# Patient Record
Sex: FEMALE | Race: WHITE | Employment: OTHER | URBAN - NONMETROPOLITAN AREA
[De-identification: names, ages, dates, MRNs, and addresses within clinical notes are randomized per-mention and may not be internally consistent; named-entity substitution may affect disease eponyms.]

---

## 2017-01-03 ENCOUNTER — OFFICE VISIT (OUTPATIENT)
Dept: OTOLARYNGOLOGY | Age: 77
End: 2017-01-03

## 2017-01-03 VITALS
TEMPERATURE: 97.7 F | BODY MASS INDEX: 26.82 KG/M2 | DIASTOLIC BLOOD PRESSURE: 86 MMHG | HEART RATE: 72 BPM | HEIGHT: 64 IN | SYSTOLIC BLOOD PRESSURE: 144 MMHG | RESPIRATION RATE: 16 BRPM | WEIGHT: 157.1 LBS

## 2017-01-03 DIAGNOSIS — R93.0: Primary | ICD-10-CM

## 2017-01-03 PROCEDURE — 99202 OFFICE O/P NEW SF 15 MIN: CPT | Performed by: OTOLARYNGOLOGY

## 2017-01-03 ASSESSMENT — ENCOUNTER SYMPTOMS
WHEEZING: 0
SHORTNESS OF BREATH: 0
DIARRHEA: 0
COUGH: 0
APNEA: 0
CHEST TIGHTNESS: 0
SORE THROAT: 0
CHOKING: 0
SINUS PRESSURE: 1
TROUBLE SWALLOWING: 1
COLOR CHANGE: 0
VOMITING: 0
VOICE CHANGE: 0
STRIDOR: 0
ABDOMINAL PAIN: 0
RHINORRHEA: 1
FACIAL SWELLING: 0
NAUSEA: 0

## 2017-06-20 ENCOUNTER — LAB SERVICES (OUTPATIENT)
Dept: OTHER | Age: 77
End: 2017-06-20

## 2017-06-20 ENCOUNTER — CHARTING TRANS (OUTPATIENT)
Dept: URGENT CARE | Age: 77
End: 2017-06-20

## 2017-06-20 LAB
BILIRUBIN URINE: NEGATIVE
BLOOD URINE: ABNORMAL
CLARITY: ABNORMAL
COLOR: ABNORMAL
GLUCOSE QUALITATIVE U: NEGATIVE
KETONES, URINE: NEGATIVE
LEUKOCYTE ESTERASE URINE: ABNORMAL
MUCOUS: ABNORMAL
NITRITE URINE: NEGATIVE
PH URINE: 6 (ref 5–7)
RED BLOOD CELLS URINE: ABNORMAL (ref 0–3)
SPECIFIC GRAVITY URINE: 1 (ref 1–1.03)
SQUAMOUS EPITHELIAL CELLS: ABNORMAL
TRANSITIONAL EPITHELIAL CELLS: ABNORMAL
URINE PROTEIN, QUAL (DIPSTICK): NEGATIVE
UROBILINOGEN URINE: <2
WBC CLUMPS: ABNORMAL
WHITE BLOOD CELLS URINE: ABNORMAL (ref 0–5)

## 2017-06-21 LAB — FINAL REPORT: ABNORMAL

## 2018-02-15 ENCOUNTER — HOSPITAL ENCOUNTER (OUTPATIENT)
Dept: MRI IMAGING | Age: 78
Discharge: HOME | End: 2018-02-15
Payer: MEDICARE

## 2018-02-15 DIAGNOSIS — H53.2 DIPLOPIA: ICD-10-CM

## 2018-02-15 LAB — POC CREATININE WHOLE BLOOD: 0.9 MG/DL (ref 0.5–1.2)

## 2018-02-15 PROCEDURE — 70543 MRI ORBT/FAC/NCK W/O &W/DYE: CPT

## 2018-02-15 PROCEDURE — 82565 ASSAY OF CREATININE: CPT

## 2018-02-15 PROCEDURE — 70553 MRI BRAIN STEM W/O & W/DYE: CPT

## 2018-02-15 PROCEDURE — A9579 GAD-BASE MR CONTRAST NOS,1ML: HCPCS | Performed by: OPHTHALMOLOGY

## 2018-02-15 PROCEDURE — 6360000004 HC RX CONTRAST MEDICATION: Performed by: OPHTHALMOLOGY

## 2018-02-15 RX ADMIN — GADOTERIDOL 15 ML: 279.3 INJECTION, SOLUTION INTRAVENOUS at 14:03

## 2018-03-13 ENCOUNTER — HOSPITAL ENCOUNTER (OUTPATIENT)
Dept: CT IMAGING | Age: 78
Discharge: HOME | End: 2018-03-13
Payer: MEDICARE

## 2018-03-13 DIAGNOSIS — H53.2 DIPLOPIA: ICD-10-CM

## 2018-03-13 PROCEDURE — 70498 CT ANGIOGRAPHY NECK: CPT

## 2018-03-13 PROCEDURE — 6360000004 HC RX CONTRAST MEDICATION: Performed by: INTERNAL MEDICINE

## 2018-03-13 RX ADMIN — IOPAMIDOL 85 ML: 755 INJECTION, SOLUTION INTRAVENOUS at 12:52

## 2018-11-21 ENCOUNTER — HOSPITAL ENCOUNTER (OUTPATIENT)
Dept: WOMENS IMAGING | Age: 78
Discharge: HOME | End: 2018-11-21
Payer: MEDICARE

## 2018-11-21 DIAGNOSIS — Z12.31 VISIT FOR SCREENING MAMMOGRAM: ICD-10-CM

## 2018-11-21 PROCEDURE — 77067 SCR MAMMO BI INCL CAD: CPT

## 2018-11-28 VITALS
DIASTOLIC BLOOD PRESSURE: 70 MMHG | TEMPERATURE: 97.7 F | RESPIRATION RATE: 14 BRPM | HEART RATE: 72 BPM | SYSTOLIC BLOOD PRESSURE: 154 MMHG

## 2018-11-30 ENCOUNTER — HOSPITAL ENCOUNTER (OUTPATIENT)
Dept: WOMENS IMAGING | Age: 78
Discharge: HOME | End: 2018-11-30
Payer: MEDICARE

## 2018-11-30 DIAGNOSIS — R92.2 BREAST DENSITY: ICD-10-CM

## 2018-11-30 PROCEDURE — 76642 ULTRASOUND BREAST LIMITED: CPT

## 2018-11-30 PROCEDURE — G0279 TOMOSYNTHESIS, MAMMO: HCPCS

## 2019-05-29 ENCOUNTER — HOSPITAL ENCOUNTER (OUTPATIENT)
Dept: WOMENS IMAGING | Age: 79
Discharge: HOME | End: 2019-05-29
Payer: MEDICARE

## 2019-05-29 DIAGNOSIS — R92.2 BREAST DENSITY: ICD-10-CM

## 2019-05-29 PROCEDURE — G0279 TOMOSYNTHESIS, MAMMO: HCPCS

## 2019-08-02 RX ORDER — FUROSEMIDE 20 MG/1
20 TABLET ORAL 2 TIMES DAILY
COMMUNITY
End: 2022-10-20 | Stop reason: SDUPTHER

## 2019-08-08 ENCOUNTER — HOSPITAL ENCOUNTER (OUTPATIENT)
Age: 79
Discharge: HOME | End: 2019-08-08
Payer: MEDICARE

## 2019-08-08 LAB
ANION GAP SERPL CALCULATED.3IONS-SCNC: 12 MEQ/L (ref 8–16)
BUN BLDV-MCNC: 18 MG/DL (ref 7–22)
CALCIUM SERPL-MCNC: 9.6 MG/DL (ref 8.5–10.5)
CHLORIDE BLD-SCNC: 101 MEQ/L (ref 98–111)
CO2: 28 MEQ/L (ref 23–33)
CREAT SERPL-MCNC: 1 MG/DL (ref 0.4–1.2)
EKG ATRIAL RATE: 68 BPM
EKG P AXIS: 54 DEGREES
EKG P-R INTERVAL: 164 MS
EKG Q-T INTERVAL: 450 MS
EKG QRS DURATION: 132 MS
EKG QTC CALCULATION (BAZETT): 478 MS
EKG R AXIS: -12 DEGREES
EKG T AXIS: -15 DEGREES
EKG VENTRICULAR RATE: 68 BPM
ERYTHROCYTE [DISTWIDTH] IN BLOOD BY AUTOMATED COUNT: 16.2 % (ref 11.5–14.5)
ERYTHROCYTE [DISTWIDTH] IN BLOOD BY AUTOMATED COUNT: 51.8 FL (ref 35–45)
GFR SERPL CREATININE-BSD FRML MDRD: 54 ML/MIN/1.73M2
GLUCOSE BLD-MCNC: 103 MG/DL (ref 70–108)
HCT VFR BLD CALC: 39.9 % (ref 37–47)
HEMOGLOBIN: 12.4 GM/DL (ref 12–16)
MCH RBC QN AUTO: 27.5 PG (ref 26–33)
MCHC RBC AUTO-ENTMCNC: 31.1 GM/DL (ref 32.2–35.5)
MCV RBC AUTO: 88.5 FL (ref 81–99)
PLATELET # BLD: 328 THOU/MM3 (ref 130–400)
PMV BLD AUTO: 9.5 FL (ref 9.4–12.4)
POTASSIUM SERPL-SCNC: 4.1 MEQ/L (ref 3.5–5.2)
RBC # BLD: 4.51 MILL/MM3 (ref 4.2–5.4)
SODIUM BLD-SCNC: 141 MEQ/L (ref 135–145)
WBC # BLD: 6.2 THOU/MM3 (ref 4.8–10.8)

## 2019-08-08 PROCEDURE — 93005 ELECTROCARDIOGRAM TRACING: CPT | Performed by: SPECIALIST

## 2019-08-08 PROCEDURE — 93010 ELECTROCARDIOGRAM REPORT: CPT | Performed by: INTERNAL MEDICINE

## 2019-08-08 PROCEDURE — 80048 BASIC METABOLIC PNL TOTAL CA: CPT

## 2019-08-08 PROCEDURE — 36415 COLL VENOUS BLD VENIPUNCTURE: CPT

## 2019-08-08 PROCEDURE — 85027 COMPLETE CBC AUTOMATED: CPT

## 2019-08-09 ENCOUNTER — HOSPITAL ENCOUNTER (OUTPATIENT)
Age: 79
Setting detail: OUTPATIENT SURGERY
Discharge: HOME | End: 2019-08-09
Attending: SPECIALIST | Admitting: SPECIALIST
Payer: MEDICARE

## 2019-08-09 ENCOUNTER — ANESTHESIA (OUTPATIENT)
Dept: OPERATING ROOM | Age: 79
End: 2019-08-09
Payer: MEDICARE

## 2019-08-09 ENCOUNTER — ANESTHESIA EVENT (OUTPATIENT)
Dept: OPERATING ROOM | Age: 79
End: 2019-08-09
Payer: MEDICARE

## 2019-08-09 VITALS
RESPIRATION RATE: 3 BRPM | SYSTOLIC BLOOD PRESSURE: 117 MMHG | OXYGEN SATURATION: 93 % | DIASTOLIC BLOOD PRESSURE: 55 MMHG

## 2019-08-09 VITALS
BODY MASS INDEX: 28.52 KG/M2 | HEART RATE: 78 BPM | OXYGEN SATURATION: 94 % | HEIGHT: 62 IN | WEIGHT: 155 LBS | RESPIRATION RATE: 10 BRPM | DIASTOLIC BLOOD PRESSURE: 73 MMHG | TEMPERATURE: 96.8 F | SYSTOLIC BLOOD PRESSURE: 159 MMHG

## 2019-08-09 PROCEDURE — 7100000010 HC PHASE II RECOVERY - FIRST 15 MIN: Performed by: SPECIALIST

## 2019-08-09 PROCEDURE — 6370000000 HC RX 637 (ALT 250 FOR IP)

## 2019-08-09 PROCEDURE — 2580000003 HC RX 258: Performed by: ANESTHESIOLOGY

## 2019-08-09 PROCEDURE — 2500000003 HC RX 250 WO HCPCS: Performed by: SPECIALIST

## 2019-08-09 PROCEDURE — 7100000001 HC PACU RECOVERY - ADDTL 15 MIN: Performed by: SPECIALIST

## 2019-08-09 PROCEDURE — 3600000002 HC SURGERY LEVEL 2 BASE: Performed by: SPECIALIST

## 2019-08-09 PROCEDURE — 6360000002 HC RX W HCPCS: Performed by: ANESTHESIOLOGY

## 2019-08-09 PROCEDURE — 88305 TISSUE EXAM BY PATHOLOGIST: CPT

## 2019-08-09 PROCEDURE — 88331 PATH CONSLTJ SURG 1 BLK 1SPC: CPT

## 2019-08-09 PROCEDURE — 3600000012 HC SURGERY LEVEL 2 ADDTL 15MIN: Performed by: SPECIALIST

## 2019-08-09 PROCEDURE — 2709999900 HC NON-CHARGEABLE SUPPLY: Performed by: SPECIALIST

## 2019-08-09 PROCEDURE — 7100000011 HC PHASE II RECOVERY - ADDTL 15 MIN: Performed by: SPECIALIST

## 2019-08-09 PROCEDURE — 6370000000 HC RX 637 (ALT 250 FOR IP): Performed by: ANESTHESIOLOGY

## 2019-08-09 PROCEDURE — 6360000002 HC RX W HCPCS

## 2019-08-09 PROCEDURE — 3700000000 HC ANESTHESIA ATTENDED CARE: Performed by: SPECIALIST

## 2019-08-09 PROCEDURE — 3700000001 HC ADD 15 MINUTES (ANESTHESIA): Performed by: SPECIALIST

## 2019-08-09 PROCEDURE — 7100000000 HC PACU RECOVERY - FIRST 15 MIN: Performed by: SPECIALIST

## 2019-08-09 RX ORDER — ONDANSETRON 2 MG/ML
4 INJECTION INTRAMUSCULAR; INTRAVENOUS ONCE
Status: COMPLETED | OUTPATIENT
Start: 2019-08-09 | End: 2019-08-09

## 2019-08-09 RX ORDER — ONDANSETRON 2 MG/ML
4 INJECTION INTRAMUSCULAR; INTRAVENOUS
Status: COMPLETED | OUTPATIENT
Start: 2019-08-09 | End: 2019-08-09

## 2019-08-09 RX ORDER — DEXAMETHASONE SODIUM PHOSPHATE 4 MG/ML
4 INJECTION, SOLUTION INTRA-ARTICULAR; INTRALESIONAL; INTRAMUSCULAR; INTRAVENOUS; SOFT TISSUE ONCE
Status: COMPLETED | OUTPATIENT
Start: 2019-08-09 | End: 2019-08-09

## 2019-08-09 RX ORDER — SODIUM CHLORIDE 9 MG/ML
INJECTION, SOLUTION INTRAVENOUS CONTINUOUS PRN
Status: DISCONTINUED | OUTPATIENT
Start: 2019-08-09 | End: 2019-08-09 | Stop reason: SDUPTHER

## 2019-08-09 RX ORDER — PROPOFOL 10 MG/ML
INJECTION, EMULSION INTRAVENOUS PRN
Status: DISCONTINUED | OUTPATIENT
Start: 2019-08-09 | End: 2019-08-09 | Stop reason: SDUPTHER

## 2019-08-09 RX ORDER — FENTANYL CITRATE 50 UG/ML
50 INJECTION, SOLUTION INTRAMUSCULAR; INTRAVENOUS EVERY 5 MIN PRN
Status: DISCONTINUED | OUTPATIENT
Start: 2019-08-09 | End: 2019-08-09 | Stop reason: HOSPADM

## 2019-08-09 RX ORDER — FENTANYL CITRATE 50 UG/ML
INJECTION, SOLUTION INTRAMUSCULAR; INTRAVENOUS PRN
Status: DISCONTINUED | OUTPATIENT
Start: 2019-08-09 | End: 2019-08-09 | Stop reason: SDUPTHER

## 2019-08-09 RX ORDER — ACETAMINOPHEN 325 MG/1
650 TABLET ORAL ONCE
Status: COMPLETED | OUTPATIENT
Start: 2019-08-09 | End: 2019-08-09

## 2019-08-09 RX ORDER — ACETAMINOPHEN 325 MG/1
TABLET ORAL
Status: COMPLETED
Start: 2019-08-09 | End: 2019-08-09

## 2019-08-09 RX ORDER — MINERAL OIL AND WHITE PETROLATUM 150; 830 MG/G; MG/G
OINTMENT OPHTHALMIC PRN
Status: DISCONTINUED | OUTPATIENT
Start: 2019-08-09 | End: 2019-08-09 | Stop reason: SDUPTHER

## 2019-08-09 RX ORDER — MEPERIDINE HYDROCHLORIDE 25 MG/ML
12.5 INJECTION INTRAMUSCULAR; INTRAVENOUS; SUBCUTANEOUS EVERY 5 MIN PRN
Status: DISCONTINUED | OUTPATIENT
Start: 2019-08-09 | End: 2019-08-09 | Stop reason: HOSPADM

## 2019-08-09 RX ORDER — LABETALOL 20 MG/4 ML (5 MG/ML) INTRAVENOUS SYRINGE
5 EVERY 10 MIN PRN
Status: DISCONTINUED | OUTPATIENT
Start: 2019-08-09 | End: 2019-08-09 | Stop reason: HOSPADM

## 2019-08-09 RX ORDER — LIDOCAINE HYDROCHLORIDE AND EPINEPHRINE 10; 10 MG/ML; UG/ML
INJECTION, SOLUTION INFILTRATION; PERINEURAL PRN
Status: DISCONTINUED | OUTPATIENT
Start: 2019-08-09 | End: 2019-08-09 | Stop reason: ALTCHOICE

## 2019-08-09 RX ORDER — DEXAMETHASONE SODIUM PHOSPHATE 4 MG/ML
INJECTION, SOLUTION INTRA-ARTICULAR; INTRALESIONAL; INTRAMUSCULAR; INTRAVENOUS; SOFT TISSUE
Status: COMPLETED
Start: 2019-08-09 | End: 2019-08-09

## 2019-08-09 RX ADMIN — Medication 2 G: at 14:52

## 2019-08-09 RX ADMIN — FENTANYL CITRATE 25 MCG: 50 INJECTION INTRAMUSCULAR; INTRAVENOUS at 16:09

## 2019-08-09 RX ADMIN — FENTANYL CITRATE 25 MCG: 50 INJECTION, SOLUTION INTRAMUSCULAR; INTRAVENOUS at 18:30

## 2019-08-09 RX ADMIN — DEXAMETHASONE SODIUM PHOSPHATE 4 MG: 4 INJECTION, SOLUTION INTRAMUSCULAR; INTRAVENOUS at 18:06

## 2019-08-09 RX ADMIN — SODIUM CHLORIDE: 9 INJECTION, SOLUTION INTRAVENOUS at 14:40

## 2019-08-09 RX ADMIN — ONDANSETRON 4 MG: 2 INJECTION INTRAMUSCULAR; INTRAVENOUS at 18:26

## 2019-08-09 RX ADMIN — ONDANSETRON 4 MG: 2 INJECTION INTRAMUSCULAR; INTRAVENOUS at 17:51

## 2019-08-09 RX ADMIN — DEXAMETHASONE SODIUM PHOSPHATE 4 MG: 4 INJECTION, SOLUTION INTRA-ARTICULAR; INTRALESIONAL; INTRAMUSCULAR; INTRAVENOUS; SOFT TISSUE at 18:06

## 2019-08-09 RX ADMIN — FENTANYL CITRATE 50 MCG: 50 INJECTION INTRAMUSCULAR; INTRAVENOUS at 14:45

## 2019-08-09 RX ADMIN — ACETAMINOPHEN 650 MG: 325 TABLET ORAL at 19:55

## 2019-08-09 RX ADMIN — FENTANYL CITRATE 25 MCG: 50 INJECTION INTRAMUSCULAR; INTRAVENOUS at 15:39

## 2019-08-09 RX ADMIN — PROPOFOL 150 MG: 10 INJECTION, EMULSION INTRAVENOUS at 14:45

## 2019-08-09 RX ADMIN — FENTANYL CITRATE 25 MCG: 50 INJECTION, SOLUTION INTRAMUSCULAR; INTRAVENOUS at 18:14

## 2019-08-09 RX ADMIN — MINERAL OIL AND WHITE PETROLATUM 1 INCH: 150; 830 OINTMENT OPHTHALMIC at 14:45

## 2019-08-09 ASSESSMENT — PULMONARY FUNCTION TESTS
PIF_VALUE: 2
PIF_VALUE: 0
PIF_VALUE: 2
PIF_VALUE: 3
PIF_VALUE: 2
PIF_VALUE: 3
PIF_VALUE: 2
PIF_VALUE: 0
PIF_VALUE: 2
PIF_VALUE: 3
PIF_VALUE: 2
PIF_VALUE: 4
PIF_VALUE: 16
PIF_VALUE: 2
PIF_VALUE: 3
PIF_VALUE: 1
PIF_VALUE: 2
PIF_VALUE: 2
PIF_VALUE: 0
PIF_VALUE: 2
PIF_VALUE: 1
PIF_VALUE: 2
PIF_VALUE: 14
PIF_VALUE: 2
PIF_VALUE: 2
PIF_VALUE: 0
PIF_VALUE: 2
PIF_VALUE: 2
PIF_VALUE: 3
PIF_VALUE: 3
PIF_VALUE: 2
PIF_VALUE: 14
PIF_VALUE: 2
PIF_VALUE: 2
PIF_VALUE: 3
PIF_VALUE: 2
PIF_VALUE: 2
PIF_VALUE: 1
PIF_VALUE: 2
PIF_VALUE: 3
PIF_VALUE: 2
PIF_VALUE: 3
PIF_VALUE: 2
PIF_VALUE: 0
PIF_VALUE: 2
PIF_VALUE: 3
PIF_VALUE: 2
PIF_VALUE: 1
PIF_VALUE: 2
PIF_VALUE: 3
PIF_VALUE: 2
PIF_VALUE: 0
PIF_VALUE: 2
PIF_VALUE: 4
PIF_VALUE: 2
PIF_VALUE: 3
PIF_VALUE: 2
PIF_VALUE: 2

## 2019-08-09 ASSESSMENT — PAIN SCALES - GENERAL
PAINLEVEL_OUTOF10: 8
PAINLEVEL_OUTOF10: 7
PAINLEVEL_OUTOF10: 8

## 2019-08-09 ASSESSMENT — PAIN SCALES - WONG BAKER: WONGBAKER_NUMERICALRESPONSE: 0

## 2019-08-09 ASSESSMENT — PAIN - FUNCTIONAL ASSESSMENT: PAIN_FUNCTIONAL_ASSESSMENT: 0-10

## 2019-08-09 NOTE — OP NOTE
Operative Note    Patient name: Hunter Casas             Medical Record Number: [id number]    Primary Care Physician: Mihaela Haney MD     1940    Date of Procedure: 2019    Pre-operative Diagnosis:  1. Complex (through and through) left ala 4cm2 defect s/p MOHS for basal xu carcinoma      2. 1.2cm left lateral forehead basal cell carcinoma    Post-operative Diagnosis: Same    Procedure Performed:  1. Formation and transfer of pedicle flap to nose (CPT 76168)      2. Composite cartilage graft from left ear to nose for support and lining (CPT 13065)      3. Full thickness skin graft (3 cm2) to left ear to repair defect from cartilage graft (CPT 13756)      4. Excision of left lateral forehead basal cell carcinoma creating a 2cm2 defect that was repaired with adjacent tissue transfer          (6cm2)(CPT 47793)    Surgeons/Assistants: Dr. Steffen Ovalle PA-C    Estimated Blood Loss: 37NC     Complications: none immediately appreciated    Procedure: With the patient lying in the supine position and under adequate anesthesia per the anesthesia team.   Local anesthesia consisting of 23 ml of 1% Lidocaine 1:100,000 with epinephrine solution of the donor site of the left infraclavicular area  as well as the left nasal ala/cheek, left ear and left lateral forehead basal cell carcinoma. The carcinoma was excised and sent for fresh frozen evaluation after the area was prepped and draped in the standard surgical fashion. Pathology called back to the room and stated the margin were free. The patient has very thin skin and a large defect which could not be closed primarily without distortion of the left brow, therefore a rotation flap was designed elevated and inset with 4-0 Monocryl suture. The burrow's triangles were removed and then final closure was completed with 5-0 fast absorbing suture and bacitracin.   A deep/inferior margin was excised of the left ala as well and sent for

## 2019-08-09 NOTE — ANESTHESIA PRE PROCEDURE
tablet Take 81 mg by mouth daily. Yes Historical Provider, MD   polyethylene glycol (GLYCOLAX) powder Colonoscopy Prep Dispense 255 Gram Bottle. Use as Directed 4/26/18   Chico Knox MD   nitroGLYCERIN (NITROSTAT) 0.4 MG SL tablet Place 1 tablet under the tongue every 5 minutes as needed for Chest pain 5/18/16   Luisa Bradshaw MD       Current medications:    No current facility-administered medications for this encounter. Allergies: Allergies   Allergen Reactions    Pcn [Penicillins]      Swelling at site of injection    Sulfa Antibiotics Hives       Problem List:    Patient Active Problem List   Diagnosis Code    Incontinence of bowel R15.9    Chest pain syndrome R07.9    GERD (gastroesophageal reflux disease) K21.9       Past Medical History:        Diagnosis Date    Anemia     Arthritis     Bronchitis     CAD (coronary artery disease)     Depression     Fibromyalgia     Frozen shoulder     left    GERD (gastroesophageal reflux disease)     History of shingles     Hyperlipidemia     Hypertension     Mitral valve prolapse     Osteoarthritis     Pernicious anemia     Pleurisy     Sinus infection     Skin cancer 2015, 2016    removed from her nose       Past Surgical History:        Procedure Laterality Date    CARDIAC CATHETERIZATION      COLONOSCOPY  2003, 2012    [facility]    ELBOW SURGERY Right     FOOT SURGERY Right 12/07/2016    REMOVAL OF SCREW AND PLATE    HEEL SPUR SURGERY Right 6/1/2016    HEMORRHOID SURGERY      HYSTERECTOMY      MOHS SURGERY Right 9/25/2015    [facility]    SHOULDER SURGERY Right     TONSILLECTOMY      UPPER GASTROINTESTINAL ENDOSCOPY  2003, 2008, 2012 2008 and 2012 with dilation, [facility]       Social History:    Social History     Tobacco Use    Smoking status: Never Smoker    Smokeless tobacco: Never Used   Substance Use Topics    Alcohol use:  No                                Counseling given: Not Answered      Vital Signs (Current): Vitals:    08/05/19 1325 08/09/19 1241   BP:  (!) 171/79   Pulse:  58   Resp:  16   Temp:  98.2 °F (36.8 °C)   TempSrc:  Temporal   SpO2:  94%   Weight: 150 lb (68 kg) 155 lb (70.3 kg)   Height: 5' 2\" (1.575 m)                                               BP Readings from Last 3 Encounters:   08/09/19 (!) 171/79   04/26/18 126/82   01/03/17 (!) 144/86       NPO Status: Time of last liquid consumption: 2200                        Time of last solid consumption: 2000                        Date of last liquid consumption: 08/08/19                        Date of last solid food consumption: 08/08/19    BMI:   Wt Readings from Last 3 Encounters:   08/09/19 155 lb (70.3 kg)   04/26/18 156 lb 4 oz (70.9 kg)   01/03/17 157 lb 1.6 oz (71.3 kg)     Body mass index is 28.35 kg/m². CBC:   Lab Results   Component Value Date    WBC 6.2 08/08/2019    RBC 4.51 08/08/2019    HGB 12.4 08/08/2019    HCT 39.9 08/08/2019    MCV 88.5 08/08/2019    RDW 15.6 05/18/2016     08/08/2019       CMP:   Lab Results   Component Value Date     08/08/2019    K 4.1 08/08/2019     08/08/2019    CO2 28 08/08/2019    BUN 18 08/08/2019    CREATININE 1.0 08/08/2019    LABGLOM 54 08/08/2019    GLUCOSE 103 08/08/2019    PROT 6.6 05/18/2016    CALCIUM 9.6 08/08/2019    BILITOT 0.3 05/18/2016    ALKPHOS 87 05/18/2016    AST 19 05/18/2016    ALT 10 05/18/2016       POC Tests: No results for input(s): POCGLU, POCNA, POCK, POCCL, POCBUN, POCHEMO, POCHCT in the last 72 hours.     Coags:   Lab Results   Component Value Date    APTT 31.9 05/18/2016       HCG (If Applicable): No results found for: PREGTESTUR, PREGSERUM, HCG, HCGQUANT     ABGs: No results found for: PHART, PO2ART, LOJ6GSF, OEA5OXK, BEART, G8GWBJSR     Type & Screen (If Applicable):  No results found for: [facility]    Anesthesia Evaluation  Patient summary reviewed and Nursing notes reviewed no history of anesthetic complications:   Airway: Mallampati: III  TM distance: >3 FB   Neck ROM: full  Mouth opening: > = 3 FB Dental:          Pulmonary:Negative Pulmonary ROS and normal exam  breath sounds clear to auscultation                             Cardiovascular:  Exercise tolerance: good (>4 METS),   (+) hypertension:, valvular problems/murmurs: MVP, CAD:,       ECG reviewed                        Neuro/Psych:   (+) neuromuscular disease:, psychiatric history:            GI/Hepatic/Renal:   (+) GERD: well controlled,           Endo/Other:    (+) blood dyscrasia::., .                  ROS comment: Pernicious anemia Abdominal:           Vascular: negative vascular ROS. Anesthesia Plan      general     ASA 3       Induction: intravenous. MIPS: Postoperative opioids intended and Prophylactic antiemetics administered. Anesthetic plan and risks discussed with patient.                       Aleida Yang,    8/9/2019

## 2019-08-09 NOTE — PROGRESS NOTES
In preparation for their surgical procedure above patient was screened for Obstructive Sleep Apnea (JAMES) using the STOP-Bang Questionnaire by the Pre-Admission Testing department. This is a pre-surgical screening tool for patient safety and serves as a recommendation, this WILL NOT cause cancellation of surgery. STOP-Bang Questionnaire  * Do you currently see a pulmonologist?  No     If yes STOP, do not complete. }. 1.  Do you snore loudly (able to be heard in the next room)? No    2. Do you often feel tired or sleepy during the daytime? No       3. Has anyone ever told you that you stop breathing during your sleep? No    4. Do you have or are you being treated for high blood pressure? Yes      5. BMI more than 35? BMI (Calculated): 27.5        No    6. Age over 48 years? 67 y.o.      yes    7. Neck Circumference greater than 17 inches for male or 16 inches for female? Measured           (visits only)            No    8. Gender Male? No      TOTAL SCORE: 2    JAMES - Low Risk : Yes to 0 - 2 questions  JAMES - Intermediate Risk : Yes to 3 - 4 questions  JAMES - High Risk : Yes to 5 - 8 questions    Adapted from:   STOP Questionnaire: A Tool to Screen Patients for Obstructive Sleep Apnea   Milderd MARISSA Aponte.C.P.C., ANGELA Gutierrez.B.B.S., Kristi James M.D., Eboni Camejo. Gricelda Dias, Ph.D., ANGELA Zuniga.B.B.S., Moises Saint, M.Sc., Tai Marc M.D., Chuy Valderrama. LIBRA Wilson.P.C.    Anesthesiology 2008; [id number] Copyright 2008, the [address] of [employer]est[employer], [facility] 37.   ----------------------------------------------------------------------------------------------------------------
NPO after midnight  Bring insurance info and 's license  Wear comfortable clean clothing  Do not bring jewelry   Shower night before and morning of surgery with a liquid antibacterial soap  Bring medications in original bottles  Follow all instructions given by your physician   needed at discharge  Call [phone] for any questions
restroom. Patient assisted to side of bed and to wheelchair with assistance from RN. Patient tolerated transfer well. 1915-RN assisted patient in bathroom. Patient noted that when she bent over she felt pressure in forehead around incision. Incision assessed. Hematoma has appeared. Hematoma not present at previous check. Approximately the size of a 50 cent piece. Will notify Dr. Perla Pride. 1921-Barb RN Notified Dr. Perla Pride of hematoma around incision on forehead. Order received to place cold compress. Will apply and monitor. 1930-Patient assisted back to room. Assisted to chair. Cold compress applied. Placed with ace wrap. Patient tolerating well. Patient provided with snack. Patient states pain and nausea are still tolerable. 1950-Patient states she is having headache and requesting tylenol. Patient denies wanting norco as it makes her stomach upset. Patient medicated with tylenol. Rates pain 8/10. See MAR. Cold compress removed from forehead. Swelling improved. Will leave cold compress off at this times. 2010-Patient rates pain 5/10. States tylenol has improved. 2020-Discharge instructions reviewed. Verbalized understanding. Emphasized if swelling appears and does not improve with cold compress to call Dr. Perla Pride. Patient states she wants to get dressed. Hematoma on forehead improved. IV removed with no complications. Patient getting dressed with assistance from   2040-Patient discharged in stable condition. Patient brought to car via wheelchair with assistance from RN. Patient tolerated well. All belongings sent with patient.

## 2020-01-27 ENCOUNTER — HOSPITAL ENCOUNTER (OUTPATIENT)
Dept: GENERAL RADIOLOGY | Age: 80
Discharge: HOME | End: 2020-01-27
Payer: MEDICARE

## 2020-01-27 ENCOUNTER — HOSPITAL ENCOUNTER (OUTPATIENT)
Age: 80
Discharge: HOME | End: 2020-01-27
Payer: MEDICARE

## 2020-01-27 PROCEDURE — 74022 RADEX COMPL AQT ABD SERIES: CPT

## 2020-02-12 ENCOUNTER — HOSPITAL ENCOUNTER (OUTPATIENT)
Dept: ULTRASOUND IMAGING | Age: 80
Discharge: HOME | End: 2020-02-12
Payer: MEDICARE

## 2020-02-12 PROCEDURE — 76775 US EXAM ABDO BACK WALL LIM: CPT

## 2020-07-08 ENCOUNTER — HOSPITAL ENCOUNTER (OUTPATIENT)
Dept: WOMENS IMAGING | Age: 80
Discharge: HOME | End: 2020-07-08
Payer: MEDICARE

## 2020-07-08 PROCEDURE — 77063 BREAST TOMOSYNTHESIS BI: CPT

## 2021-06-14 ENCOUNTER — HOSPITAL ENCOUNTER (OUTPATIENT)
Dept: GENERAL RADIOLOGY | Age: 81
Discharge: HOME | End: 2021-06-14
Payer: MEDICARE

## 2021-06-14 ENCOUNTER — HOSPITAL ENCOUNTER (OUTPATIENT)
Age: 81
Discharge: HOME | End: 2021-06-14
Payer: MEDICARE

## 2021-06-14 DIAGNOSIS — K59.00 CONSTIPATION, UNSPECIFIED CONSTIPATION TYPE: ICD-10-CM

## 2021-06-14 PROCEDURE — 74022 RADEX COMPL AQT ABD SERIES: CPT

## 2021-09-11 ENCOUNTER — TELEPHONE (OUTPATIENT)
Dept: FAMILY MEDICINE CLINIC | Age: 81
End: 2021-09-11

## 2021-09-11 RX ORDER — NITROFURANTOIN 25; 75 MG/1; MG/1
100 CAPSULE ORAL 2 TIMES DAILY
Qty: 14 CAPSULE | Refills: 0 | Status: SHIPPED | OUTPATIENT
Start: 2021-09-11 | End: 2021-09-18

## 2021-09-11 NOTE — TELEPHONE ENCOUNTER
Pt called on call line and reports that she woke up today with dysuria and frequency. She gets a few UTI's a year and this feel like one. She denies any fever or chills and no blood in her urine. She states that macrobid usually helps. Prescription sent to pharmacy. Pt to follow up with Dr Gallo Mena next week, ER for any acute changes. Pt was agreeable to plan.     Electronically signed by SONIA De Los Santos CNP on 9/11/2021 at 10:00 AM    Orders Placed This Encounter   Medications    nitrofurantoin, macrocrystal-monohydrate, (MACROBID) 100 MG capsule     Sig: Take 1 capsule by mouth 2 times daily for 7 days     Dispense:  14 capsule     Refill:  0

## 2021-10-16 LAB
A/G RATIO: NORMAL
ALBUMIN SERPL-MCNC: 4 G/DL
ALP BLD-CCNC: 85 U/L
ALT SERPL-CCNC: 12 U/L
AST SERPL-CCNC: 22 U/L
BILIRUB SERPL-MCNC: 0.4 MG/DL (ref 0.1–1.4)
BILIRUBIN DIRECT: 0.1 MG/DL
BILIRUBIN, INDIRECT: NORMAL
BUN BLDV-MCNC: 12 MG/DL
CALCIUM SERPL-MCNC: 9.7 MG/DL
CHLORIDE BLD-SCNC: 106 MMOL/L
CHOLESTEROL, TOTAL: 133 MG/DL
CHOLESTEROL/HDL RATIO: 2.6
CO2: 31 MMOL/L
CREAT SERPL-MCNC: 1.06 MG/DL
GFR CALCULATED: 50
GLOBULIN: NORMAL
GLUCOSE BLD-MCNC: 89 MG/DL
HCT: 37.4 %
HDLC SERPL-MCNC: 52 MG/DL (ref 35–70)
HEMOGLOBIN: 11.7
LDL CHOLESTEROL CALCULATED: 51 MG/DL (ref 0–160)
Lab: 331
MCH, POC: 23.7 PG (ref 40–?)
MCHC, POC: 31.2
MCV RBC AUTO: 76 FL
MPV, POC: 7.7 FL
NONHDLC SERPL-MCNC: 1 MG/DL
POTASSIUM SERPL-SCNC: 4.1 MMOL/L
PROTEIN TOTAL: 6.9 G/DL
RBC # BLD: 4.92 10^6/ΜL
RDW, POC: 17.7 %
SODIUM BLD-SCNC: 143 MMOL/L
TRIGL SERPL-MCNC: 148 MG/DL
VLDLC SERPL CALC-MCNC: 30 MG/DL
WBC, POC: 5.6

## 2021-10-25 LAB — Lab: NORMAL

## 2021-12-03 ENCOUNTER — HOSPITAL ENCOUNTER (OUTPATIENT)
Dept: WOMENS IMAGING | Age: 81
Discharge: HOME | End: 2021-12-03
Payer: MEDICARE

## 2021-12-03 DIAGNOSIS — Z12.31 VISIT FOR SCREENING MAMMOGRAM: ICD-10-CM

## 2021-12-03 PROCEDURE — 77063 BREAST TOMOSYNTHESIS BI: CPT

## 2022-03-12 ENCOUNTER — TELEPHONE (OUTPATIENT)
Dept: FAMILY MEDICINE CLINIC | Age: 82
End: 2022-03-12

## 2022-03-12 RX ORDER — CIPROFLOXACIN 500 MG/1
500 TABLET, FILM COATED ORAL 2 TIMES DAILY
Qty: 10 TABLET | Refills: 0 | Status: SHIPPED | OUTPATIENT
Start: 2022-03-12 | End: 2022-03-17

## 2022-03-12 NOTE — TELEPHONE ENCOUNTER
On call note- Pt called and reports that she started last night with burning with urination and frequency. She also notes she had some blood in her urine. She states that she gets UTI's a few times a year, the last time just being 1 month ago. She was treated at that time with Macrobid. She denies any fever or chills. No flank pain. Pt to increase fluids and prescription sent in for Cipro 500 mg BID x 5 days. Pt to follow up with Dr Maximiliano Church on Monday for further evaluation of recurrent UTI's. ER for any acute changes. Pt verbalized understanding.       Orders Placed This Encounter   Medications    ciprofloxacin (CIPRO) 500 MG tablet     Sig: Take 1 tablet by mouth 2 times daily for 5 days     Dispense:  10 tablet     Refill:  0     Electronically signed by SONIA Orr CNP on 3/12/2022 at 9:13 AM

## 2022-04-11 RX ORDER — ASPIRIN 81 MG/1
81 TABLET ORAL DAILY
COMMUNITY

## 2022-10-20 ENCOUNTER — OFFICE VISIT (OUTPATIENT)
Dept: CARDIOLOGY CLINIC | Age: 82
End: 2022-10-20
Payer: MEDICARE

## 2022-10-20 VITALS
RESPIRATION RATE: 18 BRPM | HEART RATE: 68 BPM | WEIGHT: 156.4 LBS | HEIGHT: 62 IN | DIASTOLIC BLOOD PRESSURE: 78 MMHG | BODY MASS INDEX: 28.78 KG/M2 | SYSTOLIC BLOOD PRESSURE: 138 MMHG

## 2022-10-20 DIAGNOSIS — E78.5 DYSLIPIDEMIA (HIGH LDL; LOW HDL): ICD-10-CM

## 2022-10-20 DIAGNOSIS — R07.9 CHEST PAIN SYNDROME: Primary | ICD-10-CM

## 2022-10-20 PROCEDURE — G8417 CALC BMI ABV UP PARAM F/U: HCPCS | Performed by: INTERNAL MEDICINE

## 2022-10-20 PROCEDURE — G8400 PT W/DXA NO RESULTS DOC: HCPCS | Performed by: INTERNAL MEDICINE

## 2022-10-20 PROCEDURE — G8427 DOCREV CUR MEDS BY ELIG CLIN: HCPCS | Performed by: INTERNAL MEDICINE

## 2022-10-20 PROCEDURE — 1090F PRES/ABSN URINE INCON ASSESS: CPT | Performed by: INTERNAL MEDICINE

## 2022-10-20 PROCEDURE — 93000 ELECTROCARDIOGRAM COMPLETE: CPT | Performed by: INTERNAL MEDICINE

## 2022-10-20 PROCEDURE — 1123F ACP DISCUSS/DSCN MKR DOCD: CPT | Performed by: INTERNAL MEDICINE

## 2022-10-20 PROCEDURE — G8484 FLU IMMUNIZE NO ADMIN: HCPCS | Performed by: INTERNAL MEDICINE

## 2022-10-20 PROCEDURE — 1036F TOBACCO NON-USER: CPT | Performed by: INTERNAL MEDICINE

## 2022-10-20 PROCEDURE — 99214 OFFICE O/P EST MOD 30 MIN: CPT | Performed by: INTERNAL MEDICINE

## 2022-10-20 RX ORDER — NITROGLYCERIN 0.4 MG/1
0.4 TABLET SUBLINGUAL EVERY 5 MIN PRN
Qty: 25 TABLET | Refills: 3 | Status: SHIPPED | OUTPATIENT
Start: 2022-10-20

## 2022-10-20 RX ORDER — FUROSEMIDE 20 MG/1
20 TABLET ORAL 2 TIMES DAILY
Qty: 180 TABLET | Refills: 4 | Status: SHIPPED | OUTPATIENT
Start: 2022-10-20

## 2022-10-20 ASSESSMENT — ENCOUNTER SYMPTOMS
TROUBLE SWALLOWING: 0
NAUSEA: 0
CHOKING: 0
ABDOMINAL DISTENTION: 0
CHEST TIGHTNESS: 0
VOICE CHANGE: 0
WHEEZING: 0
ANAL BLEEDING: 0
SHORTNESS OF BREATH: 0
STRIDOR: 0
APNEA: 0
COLOR CHANGE: 0
VOMITING: 0
ABDOMINAL PAIN: 0
COUGH: 0
BLOOD IN STOOL: 0

## 2022-10-20 NOTE — PROGRESS NOTES
[address]  Dept: [address]  Loc: [phone]     10/20/2022       Kavita Clemens is here today for   Chief Complaint   Patient presents with    Follow-up           Referring Physician:  No ref. provider found     Patient Active Problem List   Diagnosis    Incontinence of bowel    Chest pain syndrome    GERD (gastroesophageal reflux disease)       Review of Systems   Constitutional:  Negative for activity change, appetite change, fatigue, fever and unexpected weight change. HENT:  Negative for congestion, trouble swallowing and voice change. Eyes:  Negative for visual disturbance. Respiratory:  Negative for apnea, cough, choking, chest tightness, shortness of breath, wheezing and stridor. Cardiovascular:  Negative for chest pain, palpitations and leg swelling. Gastrointestinal:  Negative for abdominal distention, abdominal pain, anal bleeding, blood in stool, nausea and vomiting. Endocrine: Negative for cold intolerance and heat intolerance. Genitourinary:  Negative for hematuria. Musculoskeletal:  Negative for arthralgias, gait problem, joint swelling and myalgias. Skin:  Negative for color change and rash. Allergic/Immunologic: Negative for environmental allergies and food allergies. Neurological:  Negative for dizziness, tremors, syncope, facial asymmetry, weakness, light-headedness, numbness and headaches. Hematological:  Does not bruise/bleed easily. Psychiatric/Behavioral:  Negative for agitation, behavioral problems and sleep disturbance.        Past Medical History:   Diagnosis Date    Anemia     Arthritis     Bronchitis     CAD (coronary artery disease)     Depression     Fibromyalgia     Frozen shoulder     left    GERD (gastroesophageal reflux disease)     History of shingles     Hyperlipidemia     Hypertension     Mitral valve prolapse     Osteoarthritis Pernicious anemia     Pleurisy     Sinus infection     Skin cancer 2015, 2016    removed from her nose       Allergies   Allergen Reactions    Pcn [Penicillins]      Swelling at site of injection    Sulfa Antibiotics Hives       Current Outpatient Medications   Medication Sig Dispense Refill    furosemide (LASIX) 20 MG tablet Take 1 tablet by mouth 2 times daily 180 tablet 4    nitroGLYCERIN (NITROSTAT) 0.4 MG SL tablet Place 1 tablet under the tongue every 5 minutes as needed for Chest pain 25 tablet 3    aspirin 81 MG EC tablet Take 81 mg by mouth daily      zolpidem (AMBIEN) 5 MG tablet Take 5 mg by mouth nightly as needed for Sleep. .      polyethylene glycol (GLYCOLAX) powder Colonoscopy Prep Dispense 255 Gram Bottle. Use as Directed 255 g 0    pantoprazole (PROTONIX) 40 MG tablet Take 40 mg by mouth daily      DULoxetine (CYMBALTA) 30 MG extended release capsule Take 60 mg by mouth daily       famotidine (PEPCID) 40 MG tablet Take 40 mg by mouth every evening      cyanocobalamin 1000 MCG/ML injection Inject 1,000 mcg into the muscle every 30 days      sucralfate (CARAFATE) 1 GM tablet Take 1 tablet by mouth 4 times daily (Patient taking differently: Take 1 g by mouth 4 times daily as needed) 120 tablet 3    Coenzyme Q10 (COQ-10) 200 MG CAPS Take by mouth daily       Calcium Carbonate-Vitamin D (CALCIUM-VITAMIN D) 500-200 MG-UNIT per tablet Take 1 tablet by mouth 2 times daily (with meals). Vitamin D (CHOLECALCIFEROL) 1000 UNITS CAPS capsule Take 1,000 Units by mouth daily       gabapentin (NEURONTIN) 300 MG capsule Take 300 mg by mouth 3 times daily. metoprolol (TOPROL-XL) 50 MG XL tablet Take 50 mg by mouth daily. atorvastatin (LIPITOR) 40 MG tablet Take 40 mg by mouth daily. No current facility-administered medications for this visit.        Social History     Socioeconomic History    Marital status:      Spouse name: None    Number of children: None    Years of education: None Highest education level: None   Tobacco Use    Smoking status: Never    Smokeless tobacco: Never   Vaping Use    Vaping Use: Never used   Substance and Sexual Activity    Alcohol use: No    Drug use: No    Sexual activity: Never       Family History   Problem Relation Age of Onset    High Blood Pressure Mother     Alzheimer's Disease Mother     High Blood Pressure Father     Stroke Father     Heart Disease Sister     High Blood Pressure Sister     Ovarian Cancer Sister 66    Heart Disease Brother     High Blood Pressure Brother     Heart Disease Sister     High Blood Pressure Sister     Breast Cancer Sister 76    Heart Disease Sister     High Blood Pressure Sister     Heart Disease Sister     High Blood Pressure Sister     Heart Disease Sister     High Blood Pressure Sister     High Blood Pressure Sister     High Blood Pressure Sister     Heart Disease Brother     High Blood Pressure Brother     Other Brother         Pulmonary Embolism    Diabetes Neg Hx     Kidney Disease Neg Hx     Colon Cancer Neg Hx        Blood pressure 138/78, pulse 68, resp. rate 18, height 5' 2\" (1.575 m), weight 156 lb 6.4 oz (70.9 kg).     Physical Exam:    General Appearance: alert and oriented to person, place and time, in no acute distress  Cardiovascular: normal rate, regular rhythm, normal S1 and S2, no murmurs, rubs, clicks, or gallops, distal pulses intact, no carotid bruits, no JVD  Pulmonary/Chest: clear to auscultation bilaterally- no wheezes, rales or rhonchi, normal air movement, no respiratory distress  Abdomen: soft, non-tender, non-distended, normal bowel sounds, no masses   Extremities: no cyanosis, clubbing or edema, pulse   Skin: warm and dry, no rash or erythema  Head: normocephalic and atraumatic  Eyes: pupils equal, round, and reactive to light  Neck: supple and non-tender without mass, no thyromegaly   Musculoskeletal: normal range of motion, no joint swelling, deformity or tenderness  Neurological: alert, oriented, normal speech, no focal findings or movement disorder noted    Lab Data:    Cardiac Enzymes:  No results for input(s): CKTOTAL, CKMB, CKMBINDEX, TROPONINI in the last 72 hours. CBC:   Lab Results   Component Value Date/Time    WBC 5.6 10/15/2021 12:00 AM    RBC 4.92 10/15/2021 12:00 AM    HGB 11.7 10/15/2021 12:00 AM    HCT 37.4 10/15/2021 12:00 AM    HCT 39.9 08/08/2019 12:49 PM     10/15/2021 12:00 AM       CMP:    Lab Results   Component Value Date/Time     10/15/2021 12:00 AM    K 4.1 10/15/2021 12:00 AM     10/15/2021 12:00 AM    CO2 31 10/15/2021 12:00 AM    BUN 12 10/15/2021 12:00 AM    CREATININE 1.06 10/15/2021 12:00 AM    LABGLOM 50 10/15/2021 12:00 AM    LABGLOM 54 08/08/2019 12:49 PM    GLUCOSE 89 10/15/2021 12:00 AM    CALCIUM 9.7 10/15/2021 12:00 AM       Hepatic Function Panel:    Lab Results   Component Value Date/Time    ALKPHOS 85 10/15/2021 12:00 AM    ALT 12 10/15/2021 12:00 AM    AST 22 10/15/2021 12:00 AM    PROT 6.9 10/15/2021 12:00 AM    BILITOT 0.4 10/15/2021 12:00 AM    BILIDIR 0.1 10/15/2021 12:00 AM    LABALBU 4.0 10/15/2021 12:00 AM       Magnesium:    Lab Results   Component Value Date/Time    MG 2.4 05/18/2016 11:56 AM       PT/INR:  No results found for: PROTIME, INR    HgBA1c:  No results found for: LABA1C    FLP:    Lab Results   Component Value Date/Time    TRIG 148 10/15/2021 12:00 AM    HDL 52 10/15/2021 12:00 AM    LDLCALC 51 10/15/2021 12:00 AM       TSH:  No results found for: TSH     Diagnosis Orders   1. Chest pain syndrome  58359 - CO ELECTROCARDIOGRAM, COMPLETE    CBC    Basic Metabolic Panel    Lipid Panel    Hepatic Function Panel      2. Dyslipidemia (high LDL; low HDL)  CBC    Basic Metabolic Panel    Lipid Panel    Hepatic Function Panel           Assessment/Plan    81years old lady with known history of moderate atherosclerotic coronary artery disease this patient is here for a follow-up.   She indicates she is feeling well she denies chest pain.    Her cholesterol has been elevated and apparently she has not been taking her statin for some time she is back on the statin now. She was advised about compliance. Her EKG showed sinus rhythm right bundle branch block but no change. Her medications were reconciled and sent to her pharmacy. The lab work the EKG finding plan of care were discussed with the patient and she will continue current medication and she will be seen annually seek medical attention if she has any change in clinical condition thank    Orders Placed This Encounter   Procedures    CBC     Standing Status:   Future     Standing Expiration Date:   66/03/6095    Basic Metabolic Panel     Standing Status:   Future     Standing Expiration Date:   10/20/2023    Lipid Panel     Standing Status:   Future     Standing Expiration Date:   10/20/2023     Order Specific Question:   Is Patient Fasting?/# of Hours     Answer:   12 hours    Hepatic Function Panel     Standing Status:   Future     Standing Expiration Date:   10/20/2023    93811 - MS ELECTROCARDIOGRAM, COMPLETE       Return in about 1 year (around 10/20/2023) for cad.      Garrick Whittaker MD

## 2023-01-16 ENCOUNTER — HOSPITAL ENCOUNTER (OUTPATIENT)
Dept: WOMENS IMAGING | Age: 83
Discharge: HOME | End: 2023-01-16
Payer: MEDICARE

## 2023-01-16 DIAGNOSIS — Z12.31 VISIT FOR SCREENING MAMMOGRAM: ICD-10-CM

## 2023-01-16 PROCEDURE — 77067 SCR MAMMO BI INCL CAD: CPT

## 2023-03-23 ENCOUNTER — HOSPITAL ENCOUNTER (OUTPATIENT)
Dept: WOMENS IMAGING | Age: 83
Discharge: HOME | End: 2023-03-23
Payer: MEDICARE

## 2023-03-23 DIAGNOSIS — Z78.0 MENOPAUSE: ICD-10-CM

## 2023-03-23 DIAGNOSIS — N95.0 POSTMENOPAUSAL BLEEDING: ICD-10-CM

## 2023-03-23 PROCEDURE — 77080 DXA BONE DENSITY AXIAL: CPT

## 2023-05-02 ENCOUNTER — HOSPITAL ENCOUNTER (OUTPATIENT)
Age: 83
Discharge: HOME | End: 2023-05-02
Payer: MEDICARE

## 2023-05-02 ENCOUNTER — HOSPITAL ENCOUNTER (OUTPATIENT)
Dept: GENERAL RADIOLOGY | Age: 83
Discharge: HOME | End: 2023-05-02
Payer: MEDICARE

## 2023-05-02 DIAGNOSIS — M79.642 LEFT HAND PAIN: ICD-10-CM

## 2023-05-02 PROCEDURE — 73140 X-RAY EXAM OF FINGER(S): CPT

## 2023-05-10 ENCOUNTER — TELEPHONE (OUTPATIENT)
Dept: CARDIOLOGY CLINIC | Age: 83
End: 2023-05-10

## 2023-06-26 ENCOUNTER — TELEPHONE (OUTPATIENT)
Dept: CARDIOLOGY CLINIC | Age: 83
End: 2023-06-26

## 2023-07-25 ENCOUNTER — APPOINTMENT (OUTPATIENT)
Dept: GENERAL RADIOLOGY | Age: 83
End: 2023-07-25
Payer: MEDICARE

## 2023-07-25 ENCOUNTER — HOSPITAL ENCOUNTER (EMERGENCY)
Age: 83
Discharge: HOME | End: 2023-07-25
Attending: EMERGENCY MEDICINE
Payer: MEDICARE

## 2023-07-25 VITALS
SYSTOLIC BLOOD PRESSURE: 149 MMHG | TEMPERATURE: 98.6 F | HEART RATE: 89 BPM | DIASTOLIC BLOOD PRESSURE: 67 MMHG | RESPIRATION RATE: 14 BRPM | OXYGEN SATURATION: 95 %

## 2023-07-25 DIAGNOSIS — R11.2 NAUSEA VOMITING AND DIARRHEA: Primary | ICD-10-CM

## 2023-07-25 DIAGNOSIS — R19.7 NAUSEA VOMITING AND DIARRHEA: Primary | ICD-10-CM

## 2023-07-25 LAB
ALBUMIN SERPL BCG-MCNC: 3.7 G/DL (ref 3.5–5.1)
ALP SERPL-CCNC: 101 U/L (ref 38–126)
ALT SERPL W/O P-5'-P-CCNC: 9 U/L (ref 11–66)
ANION GAP SERPL CALC-SCNC: 13 MEQ/L (ref 8–16)
AST SERPL-CCNC: 20 U/L (ref 5–40)
BASOPHILS ABSOLUTE: 0 THOU/MM3 (ref 0–0.1)
BASOPHILS NFR BLD AUTO: 0.2 %
BILIRUB CONJ SERPL-MCNC: < 0.2 MG/DL (ref 0–0.3)
BILIRUB SERPL-MCNC: 0.4 MG/DL (ref 0.3–1.2)
BUN SERPL-MCNC: 12 MG/DL (ref 7–22)
CALCIUM SERPL-MCNC: 8.9 MG/DL (ref 8.5–10.5)
CHLORIDE SERPL-SCNC: 107 MEQ/L (ref 98–111)
CO2 SERPL-SCNC: 22 MEQ/L (ref 23–33)
CREAT SERPL-MCNC: 0.8 MG/DL (ref 0.4–1.2)
DEPRECATED RDW RBC AUTO: 49.5 FL (ref 35–45)
EKG ATRIAL RATE: 93 BPM
EKG P AXIS: 46 DEGREES
EKG P-R INTERVAL: 160 MS
EKG Q-T INTERVAL: 418 MS
EKG QRS DURATION: 126 MS
EKG QTC CALCULATION (BAZETT): 519 MS
EKG T AXIS: -36 DEGREES
EKG VENTRICULAR RATE: 93 BPM
EOSINOPHIL NFR BLD AUTO: 0 %
EOSINOPHILS ABSOLUTE: 0 THOU/MM3 (ref 0–0.4)
ERYTHROCYTE [DISTWIDTH] IN BLOOD BY AUTOMATED COUNT: 18.6 % (ref 11.5–14.5)
FLUAV RNA RESP QL NAA+PROBE: NOT DETECTED
FLUBV RNA RESP QL NAA+PROBE: NOT DETECTED
GFR SERPL CREATININE-BSD FRML MDRD: > 60 ML/MIN/1.73M2
GLUCOSE SERPL-MCNC: 105 MG/DL (ref 70–108)
HCT VFR BLD AUTO: 34.7 % (ref 37–47)
HGB BLD-MCNC: 10.3 GM/DL (ref 12–16)
IMM GRANULOCYTES # BLD AUTO: 0.01 THOU/MM3 (ref 0–0.07)
IMM GRANULOCYTES NFR BLD AUTO: 0.2 %
LACTATE SERPL-SCNC: 1.5 MMOL/L (ref 0.5–2)
LIPASE SERPL-CCNC: 15.8 U/L (ref 5.6–51.3)
LYMPHOCYTES ABSOLUTE: 0.5 THOU/MM3 (ref 1–4.8)
LYMPHOCYTES NFR BLD AUTO: 12.3 %
MCH RBC QN AUTO: 22 PG (ref 26–33)
MCHC RBC AUTO-ENTMCNC: 29.7 GM/DL (ref 32.2–35.5)
MCV RBC AUTO: 74 FL (ref 81–99)
MONOCYTES ABSOLUTE: 0.5 THOU/MM3 (ref 0.4–1.3)
MONOCYTES NFR BLD AUTO: 12.3 %
NEUTROPHILS NFR BLD AUTO: 75 %
NRBC BLD AUTO-RTO: 0 /100 WBC
OSMOLALITY SERPL CALC.SUM OF ELEC: 283.2 MOSMOL/KG (ref 275–300)
PLATELET # BLD AUTO: 290 THOU/MM3 (ref 130–400)
PMV BLD AUTO: 9.1 FL (ref 9.4–12.4)
POTASSIUM SERPL-SCNC: 3.5 MEQ/L (ref 3.5–5.2)
PROT SERPL-MCNC: 6.8 G/DL (ref 6.1–8)
RBC # BLD AUTO: 4.69 MILL/MM3 (ref 4.2–5.4)
SARS-COV-2 RNA RESP QL NAA+PROBE: NOT DETECTED
SEGMENTED NEUTROPHILS ABSOLUTE COUNT: 3.1 THOU/MM3 (ref 1.8–7.7)
SODIUM SERPL-SCNC: 142 MEQ/L (ref 135–145)
TROPONIN, HIGH SENSITIVITY: 15 NG/L (ref 0–12)
TROPONIN, HIGH SENSITIVITY: 19 NG/L (ref 0–12)
WBC # BLD AUTO: 4.1 THOU/MM3 (ref 4.8–10.8)

## 2023-07-25 PROCEDURE — C9113 INJ PANTOPRAZOLE SODIUM, VIA: HCPCS | Performed by: STUDENT IN AN ORGANIZED HEALTH CARE EDUCATION/TRAINING PROGRAM

## 2023-07-25 PROCEDURE — 96375 TX/PRO/DX INJ NEW DRUG ADDON: CPT

## 2023-07-25 PROCEDURE — 93010 ELECTROCARDIOGRAM REPORT: CPT | Performed by: INTERNAL MEDICINE

## 2023-07-25 PROCEDURE — 84484 ASSAY OF TROPONIN QUANT: CPT

## 2023-07-25 PROCEDURE — 36415 COLL VENOUS BLD VENIPUNCTURE: CPT

## 2023-07-25 PROCEDURE — 80053 COMPREHEN METABOLIC PANEL: CPT

## 2023-07-25 PROCEDURE — 96376 TX/PRO/DX INJ SAME DRUG ADON: CPT

## 2023-07-25 PROCEDURE — 82248 BILIRUBIN DIRECT: CPT

## 2023-07-25 PROCEDURE — 6360000002 HC RX W HCPCS: Performed by: STUDENT IN AN ORGANIZED HEALTH CARE EDUCATION/TRAINING PROGRAM

## 2023-07-25 PROCEDURE — 2580000003 HC RX 258: Performed by: STUDENT IN AN ORGANIZED HEALTH CARE EDUCATION/TRAINING PROGRAM

## 2023-07-25 PROCEDURE — 83690 ASSAY OF LIPASE: CPT

## 2023-07-25 PROCEDURE — 85025 COMPLETE CBC W/AUTO DIFF WBC: CPT

## 2023-07-25 PROCEDURE — 96374 THER/PROPH/DIAG INJ IV PUSH: CPT

## 2023-07-25 PROCEDURE — 99285 EMERGENCY DEPT VISIT HI MDM: CPT

## 2023-07-25 PROCEDURE — 87636 SARSCOV2 & INF A&B AMP PRB: CPT

## 2023-07-25 PROCEDURE — 93005 ELECTROCARDIOGRAM TRACING: CPT | Performed by: STUDENT IN AN ORGANIZED HEALTH CARE EDUCATION/TRAINING PROGRAM

## 2023-07-25 PROCEDURE — 71045 X-RAY EXAM CHEST 1 VIEW: CPT

## 2023-07-25 PROCEDURE — 83605 ASSAY OF LACTIC ACID: CPT

## 2023-07-25 RX ORDER — ONDANSETRON 2 MG/ML
4 INJECTION INTRAMUSCULAR; INTRAVENOUS ONCE
Status: COMPLETED | OUTPATIENT
Start: 2023-07-25 | End: 2023-07-25

## 2023-07-25 RX ORDER — ONDANSETRON 4 MG/1
4 TABLET, FILM COATED ORAL EVERY 8 HOURS PRN
Qty: 6 TABLET | Refills: 0 | Status: SHIPPED | OUTPATIENT
Start: 2023-07-25

## 2023-07-25 RX ORDER — PANTOPRAZOLE SODIUM 40 MG/10ML
40 INJECTION, POWDER, LYOPHILIZED, FOR SOLUTION INTRAVENOUS ONCE
Status: COMPLETED | OUTPATIENT
Start: 2023-07-25 | End: 2023-07-25

## 2023-07-25 RX ORDER — 0.9 % SODIUM CHLORIDE 0.9 %
1000 INTRAVENOUS SOLUTION INTRAVENOUS ONCE
Status: COMPLETED | OUTPATIENT
Start: 2023-07-25 | End: 2023-07-25

## 2023-07-25 RX ADMIN — PANTOPRAZOLE SODIUM 40 MG: 40 INJECTION, POWDER, FOR SOLUTION INTRAVENOUS at 10:25

## 2023-07-25 RX ADMIN — ONDANSETRON 4 MG: 2 INJECTION INTRAMUSCULAR; INTRAVENOUS at 10:24

## 2023-07-25 RX ADMIN — SODIUM CHLORIDE 1000 ML: 9 INJECTION, SOLUTION INTRAVENOUS at 10:27

## 2023-07-25 RX ADMIN — ONDANSETRON 4 MG: 2 INJECTION INTRAMUSCULAR; INTRAVENOUS at 12:05

## 2023-07-25 ASSESSMENT — ENCOUNTER SYMPTOMS
CONSTIPATION: 0
BLOOD IN STOOL: 0
RECTAL PAIN: 0
DIARRHEA: 1
NAUSEA: 1
ABDOMINAL DISTENTION: 0
ANAL BLEEDING: 0
ALLERGIC/IMMUNOLOGIC NEGATIVE: 1
VOMITING: 1
EYES NEGATIVE: 1
ABDOMINAL PAIN: 1
RESPIRATORY NEGATIVE: 1

## 2023-07-25 ASSESSMENT — PAIN SCALES - GENERAL: PAINLEVEL_OUTOF10: 8

## 2023-07-25 ASSESSMENT — PAIN - FUNCTIONAL ASSESSMENT
PAIN_FUNCTIONAL_ASSESSMENT: NONE - DENIES PAIN
PAIN_FUNCTIONAL_ASSESSMENT: 0-10
PAIN_FUNCTIONAL_ASSESSMENT: NONE - DENIES PAIN

## 2023-07-25 ASSESSMENT — PAIN DESCRIPTION - LOCATION: LOCATION: GENERALIZED

## 2023-07-25 NOTE — ED NOTES
Patient requests to be discharged. Extensive discussion was had with regards to discharge instructions. Patient assisted to vehicle via wheelchair for discharge.       Yasmany Brock RN  07/25/23 0562

## 2023-07-25 NOTE — ED NOTES
Patient given popsicle and carl crackers per request. Patient successfully drinks full glass of water.       Remi Diaz RN  07/25/23 7918

## 2023-07-25 NOTE — ED PROVIDER NOTES
ATTENDING NOTE:    I supervised and discussed the history, physical exam and the management of this patient with the resident. I reviewed the resident's note and agree with the documented findings and plan of care. Please see my additional note. I personally saw and examined the patient. I have reviewed and agree with the resident's findings, including all diagnostic interpretations and treatment plans as written. I was present for the key portion of any procedures performed and the inclusive time noted in any critical care statement.     Electronically verified by Jody Roa MD  07/25/23 1944
microcytic anemia is present [JT]   1125 Hepatic Function Panel(!):    Albumin 3.7   BILIRUBIN TOTAL 0.4   Bilirubin, Direct <0.2   Alk Phos 101   AST 20   ALT 9(!)   Total Protein 6.8  Hepatic function panel within normal limits [JT]   1125 Lipase: 15.8  Lipase within normal limits pancreatitis unlikely [JT]   1125 Troponin, High Sensitivity(!): 19  Troponin elevated. Will obtain a delta troponin. [JT]   1148 Patient re-evaluated. Tolerating a few sips of water at this point. Still feels myalgias and fatigue [JT]   1339 Troponin, High Sensitivity(!): 15  Delta troponin less than 6. [JT]   1351 Discussed the results of the work-up with the patient. She still reporting lack of appetite but is taking a few bites of ice cream and has not vomited yet. She has been unable to provide a stool sample here so we agreed that she can give a stool sample in the had at home and I will give her an outpatient order for the GI molecular panel so that she can have this run prior to following up with her PCP. [JT]      ED Course User Index  [JT] Poly Oates MD     Available laboratory and imaging results were independently reviewed and clinically correlated. Decision Rules/Clinical Scores utilized:  Not Applicable. Code Status:  Not addressed during this ED visit    Social determinants of health impacting treatment or disposition:  Not Applicable. Medical Commodities impacting treatment or disposition:  Not Applicable. Past Medical History:   Diagnosis Date    Anemia     Arthritis     Bronchitis     CAD (coronary artery disease)     Depression     Fibromyalgia     Frozen shoulder     left    GERD (gastroesophageal reflux disease)     History of shingles     Hyperlipidemia     Hypertension     Mitral valve prolapse     Osteoarthritis     Pernicious anemia     Pleurisy     Sinus infection     Skin cancer 2015, 2016    removed from her nose       Consultants: Not Applicable.     Final Assessment and Plan:   Nausea,

## 2023-09-12 PROBLEM — G89.18 POSTOPERATIVE PAIN: Status: ACTIVE | Noted: 2020-04-13

## 2023-09-12 PROBLEM — I10 BENIGN HYPERTENSION: Status: ACTIVE | Noted: 2020-04-14

## 2023-09-12 PROBLEM — S43.409A SHOULDER SPRAIN: Status: ACTIVE | Noted: 2023-09-12

## 2023-09-12 PROBLEM — M19.011 PRIMARY OSTEOARTHRITIS OF RIGHT SHOULDER: Status: ACTIVE | Noted: 2023-09-12

## 2023-09-12 PROBLEM — M25.419: Status: ACTIVE | Noted: 2023-09-12

## 2023-09-12 PROBLEM — I71.40 AAA (ABDOMINAL AORTIC ANEURYSM) (HCC): Status: ACTIVE | Noted: 2020-04-13

## 2023-09-12 PROBLEM — F32.A DEPRESSION: Status: ACTIVE | Noted: 2020-04-14

## 2023-09-12 PROBLEM — R06.89 ACUTE RESPIRATORY INSUFFICIENCY: Status: ACTIVE | Noted: 2020-04-14

## 2023-09-12 PROBLEM — M75.122 COMPLETE TEAR OF LEFT ROTATOR CUFF: Status: ACTIVE | Noted: 2023-09-12

## 2023-10-16 ENCOUNTER — OFFICE VISIT (OUTPATIENT)
Dept: CARDIOLOGY CLINIC | Age: 83
End: 2023-10-16
Payer: MEDICARE

## 2023-10-16 VITALS
HEIGHT: 62 IN | SYSTOLIC BLOOD PRESSURE: 131 MMHG | RESPIRATION RATE: 18 BRPM | DIASTOLIC BLOOD PRESSURE: 75 MMHG | WEIGHT: 150 LBS | HEART RATE: 72 BPM | BODY MASS INDEX: 27.6 KG/M2

## 2023-10-16 DIAGNOSIS — I10 PRIMARY HYPERTENSION: ICD-10-CM

## 2023-10-16 DIAGNOSIS — I25.10 CORONARY ARTERY DISEASE INVOLVING NATIVE CORONARY ARTERY OF NATIVE HEART WITHOUT ANGINA PECTORIS: Primary | ICD-10-CM

## 2023-10-16 DIAGNOSIS — E78.5 HYPERLIPIDEMIA LDL GOAL <70: ICD-10-CM

## 2023-10-16 PROCEDURE — 99214 OFFICE O/P EST MOD 30 MIN: CPT | Performed by: NURSE PRACTITIONER

## 2023-10-16 PROCEDURE — G8417 CALC BMI ABV UP PARAM F/U: HCPCS | Performed by: NURSE PRACTITIONER

## 2023-10-16 PROCEDURE — 3078F DIAST BP <80 MM HG: CPT | Performed by: NURSE PRACTITIONER

## 2023-10-16 PROCEDURE — G8484 FLU IMMUNIZE NO ADMIN: HCPCS | Performed by: NURSE PRACTITIONER

## 2023-10-16 PROCEDURE — 3074F SYST BP LT 130 MM HG: CPT | Performed by: NURSE PRACTITIONER

## 2023-10-16 PROCEDURE — 93000 ELECTROCARDIOGRAM COMPLETE: CPT | Performed by: INTERNAL MEDICINE

## 2023-10-16 PROCEDURE — G8399 PT W/DXA RESULTS DOCUMENT: HCPCS | Performed by: NURSE PRACTITIONER

## 2023-10-16 PROCEDURE — 1036F TOBACCO NON-USER: CPT | Performed by: NURSE PRACTITIONER

## 2023-10-16 PROCEDURE — 1090F PRES/ABSN URINE INCON ASSESS: CPT | Performed by: NURSE PRACTITIONER

## 2023-10-16 PROCEDURE — G8427 DOCREV CUR MEDS BY ELIG CLIN: HCPCS | Performed by: NURSE PRACTITIONER

## 2023-10-16 PROCEDURE — 1123F ACP DISCUSS/DSCN MKR DOCD: CPT | Performed by: NURSE PRACTITIONER

## 2023-10-16 NOTE — PROGRESS NOTES
[address] [address]  Dept: [address]: [phone]           Chief Complaint   Patient presents with    Follow-up       Cardiologist:  Dr. Isidra Wynn      Today's visit: 10/16/2023    Subjective:    Review of Systems   Constitutional: Negative. Respiratory: Negative. Cardiovascular: Negative. Gastrointestinal: Negative. Musculoskeletal: Negative. Neurological: Negative. Psychiatric/Behavioral: Negative.               Past Medical History:   Diagnosis Date    Anemia     Arthritis     Bronchitis     CAD (coronary artery disease)     Cataract     Depression     Fibromyalgia     Frozen shoulder     left    GERD (gastroesophageal reflux disease)     History of shingles     Hyperlipidemia     Hypertension     Loss of vision     Mitral valve prolapse     Osteoarthritis     Pernicious anemia     Pleurisy     Sinus infection     Skin cancer 2015, 2016    removed from her nose       Allergies   Allergen Reactions    Dilaudid [Hydromorphone] Hallucinations    Pcn [Penicillins]      Swelling at site of injection    Sulfa Antibiotics Hives       Current Outpatient Medications   Medication Sig Dispense Refill    fluticasone (FLONASE) 50 MCG/ACT nasal spray 1 spray by Each Nostril route daily      furosemide (LASIX) 20 MG tablet Take 1 tablet by mouth 2 times daily (Patient taking differently: Take 1 tablet by mouth daily As needed) 180 tablet 4    nitroGLYCERIN (NITROSTAT) 0.4 MG SL tablet Place 1 tablet under the tongue every 5 minutes as needed for Chest pain 25 tablet 3    aspirin 81 MG EC tablet Take 1 tablet by mouth daily      zolpidem (AMBIEN) 5 MG tablet Take 1 tablet by mouth nightly as needed for Sleep.      pantoprazole (PROTONIX) 40 MG tablet Take 1 tablet by mouth daily      DULoxetine (CYMBALTA) 30 MG extended release capsule Take 2 capsules by mouth daily      famotidine

## 2023-10-17 ASSESSMENT — ENCOUNTER SYMPTOMS
GASTROINTESTINAL NEGATIVE: 1
RESPIRATORY NEGATIVE: 1

## 2024-04-19 ENCOUNTER — HOSPITAL ENCOUNTER (OUTPATIENT)
Dept: WOMENS IMAGING | Age: 84
Discharge: HOME | End: 2024-04-19
Payer: MEDICARE

## 2024-04-19 VITALS — HEIGHT: 62 IN | WEIGHT: 140 LBS | BODY MASS INDEX: 25.76 KG/M2

## 2024-04-19 DIAGNOSIS — Z12.31 VISIT FOR SCREENING MAMMOGRAM: ICD-10-CM

## 2024-04-19 PROCEDURE — 77063 BREAST TOMOSYNTHESIS BI: CPT

## 2024-09-19 ENCOUNTER — HOSPITAL ENCOUNTER (OUTPATIENT)
Age: 84
Discharge: HOME | End: 2024-09-19
Payer: MEDICARE

## 2024-09-19 ENCOUNTER — HOSPITAL ENCOUNTER (OUTPATIENT)
Dept: GENERAL RADIOLOGY | Age: 84
Discharge: HOME | End: 2024-09-19
Payer: MEDICARE

## 2024-09-19 DIAGNOSIS — R52 PAIN: ICD-10-CM

## 2024-09-19 PROCEDURE — 72100 X-RAY EXAM L-S SPINE 2/3 VWS: CPT

## 2024-10-07 ENCOUNTER — OFFICE VISIT (OUTPATIENT)
Dept: CARDIOLOGY CLINIC | Age: 84
End: 2024-10-07
Payer: MEDICARE

## 2024-10-07 VITALS
BODY MASS INDEX: 29.19 KG/M2 | HEART RATE: 68 BPM | DIASTOLIC BLOOD PRESSURE: 62 MMHG | WEIGHT: 154.6 LBS | HEIGHT: 61 IN | SYSTOLIC BLOOD PRESSURE: 158 MMHG

## 2024-10-07 DIAGNOSIS — I71.43 INFRARENAL ABDOMINAL AORTIC ANEURYSM (AAA) WITHOUT RUPTURE (HCC): ICD-10-CM

## 2024-10-07 DIAGNOSIS — E78.01 FAMILIAL HYPERCHOLESTEROLEMIA: ICD-10-CM

## 2024-10-07 DIAGNOSIS — I25.10 CORONARY ARTERY DISEASE INVOLVING NATIVE CORONARY ARTERY OF NATIVE HEART WITHOUT ANGINA PECTORIS: Primary | ICD-10-CM

## 2024-10-07 DIAGNOSIS — R06.02 SHORTNESS OF BREATH: ICD-10-CM

## 2024-10-07 DIAGNOSIS — I10 PRIMARY HYPERTENSION: ICD-10-CM

## 2024-10-07 PROCEDURE — 1036F TOBACCO NON-USER: CPT | Performed by: NUCLEAR MEDICINE

## 2024-10-07 PROCEDURE — G8399 PT W/DXA RESULTS DOCUMENT: HCPCS | Performed by: NUCLEAR MEDICINE

## 2024-10-07 PROCEDURE — G8427 DOCREV CUR MEDS BY ELIG CLIN: HCPCS | Performed by: NUCLEAR MEDICINE

## 2024-10-07 PROCEDURE — 3077F SYST BP >= 140 MM HG: CPT | Performed by: NUCLEAR MEDICINE

## 2024-10-07 PROCEDURE — G8417 CALC BMI ABV UP PARAM F/U: HCPCS | Performed by: NUCLEAR MEDICINE

## 2024-10-07 PROCEDURE — 3078F DIAST BP <80 MM HG: CPT | Performed by: NUCLEAR MEDICINE

## 2024-10-07 PROCEDURE — 1123F ACP DISCUSS/DSCN MKR DOCD: CPT | Performed by: NUCLEAR MEDICINE

## 2024-10-07 PROCEDURE — G8484 FLU IMMUNIZE NO ADMIN: HCPCS | Performed by: NUCLEAR MEDICINE

## 2024-10-07 PROCEDURE — 93000 ELECTROCARDIOGRAM COMPLETE: CPT | Performed by: NUCLEAR MEDICINE

## 2024-10-07 PROCEDURE — 99214 OFFICE O/P EST MOD 30 MIN: CPT | Performed by: NUCLEAR MEDICINE

## 2024-10-07 PROCEDURE — 1090F PRES/ABSN URINE INCON ASSESS: CPT | Performed by: NUCLEAR MEDICINE

## 2024-10-07 RX ORDER — NITROGLYCERIN 0.4 MG/1
0.4 TABLET SUBLINGUAL EVERY 5 MIN PRN
Qty: 25 TABLET | Refills: 3 | Status: SHIPPED | OUTPATIENT
Start: 2024-10-07

## 2024-10-07 ASSESSMENT — ENCOUNTER SYMPTOMS
NAUSEA: 0
BACK PAIN: 0
ABDOMINAL PAIN: 0
CHEST TIGHTNESS: 0
RECTAL PAIN: 0
SHORTNESS OF BREATH: 1
ANAL BLEEDING: 0
CONSTIPATION: 0
ABDOMINAL DISTENTION: 0
COLOR CHANGE: 0
PHOTOPHOBIA: 0
VOMITING: 0
BLOOD IN STOOL: 0
DIARRHEA: 0

## 2024-10-07 NOTE — PROGRESS NOTES
New patient.  Former Dr. Eller.  EKG done today.   
of prescribed medications. All patient questions answered. Pt voicedunderstanding. Instructed to continue current medications, diet and exercise. Continue risk factor modification and medical management. Patient agreed with treatment plan. Follow up as directed.    Electronically signedby Elayne Rodriguez MD on 10/7/2024 at 12:52 PM

## 2024-10-21 ENCOUNTER — HOSPITAL ENCOUNTER (OUTPATIENT)
Dept: NUCLEAR MEDICINE | Age: 84
Discharge: HOME | End: 2024-10-21
Attending: NUCLEAR MEDICINE
Payer: MEDICARE

## 2024-10-21 ENCOUNTER — HOSPITAL ENCOUNTER (OUTPATIENT)
Age: 84
Discharge: HOME | End: 2024-10-23
Attending: NUCLEAR MEDICINE
Payer: MEDICARE

## 2024-10-21 DIAGNOSIS — R06.02 SHORTNESS OF BREATH: ICD-10-CM

## 2024-10-21 LAB
ECHO AO ASC DIAM: 3.6 CM
ECHO AO SINUS VALSALVA DIAM: 3 CM
ECHO AO ST JNCT DIAM: 2.4 CM
ECHO AV CUSP MM: 1.6 CM
ECHO AV MEAN GRADIENT: 2 MMHG
ECHO AV MEAN VELOCITY: 0.7 M/S
ECHO AV PEAK GRADIENT: 4 MMHG
ECHO AV PEAK VELOCITY: 1 M/S
ECHO AV VELOCITY RATIO: 0.8
ECHO AV VTI: 20.1 CM
ECHO EST RA PRESSURE: 3 MMHG
ECHO LA AREA 2C: 15.2 CM2
ECHO LA AREA 4C: 12.9 CM2
ECHO LA DIAMETER: 3.4 CM
ECHO LA MAJOR AXIS: 5.1 CM
ECHO LA MINOR AXIS: 4.9 CM
ECHO LA VOL BP: 32 ML (ref 22–52)
ECHO LA VOL MOD A2C: 39 ML (ref 22–52)
ECHO LA VOL MOD A4C: 25 ML (ref 22–52)
ECHO LV E' LATERAL VELOCITY: 7.5 CM/S
ECHO LV E' SEPTAL VELOCITY: 5.2 CM/S
ECHO LV EJECTION FRACTION BIPLANE: 56 % (ref 55–100)
ECHO LV FRACTIONAL SHORTENING: 29 % (ref 28–44)
ECHO LV INTERNAL DIMENSION DIASTOLIC: 4.2 CM (ref 3.9–5.3)
ECHO LV INTERNAL DIMENSION SYSTOLIC: 3 CM
ECHO LV ISOVOLUMETRIC RELAXATION TIME (IVRT): 106 MS
ECHO LV IVSD: 1.2 CM (ref 0.6–0.9)
ECHO LV MASS 2D: 178.2 G (ref 67–162)
ECHO LV POSTERIOR WALL DIASTOLIC: 1.2 CM (ref 0.6–0.9)
ECHO LV RELATIVE WALL THICKNESS RATIO: 0.57
ECHO LVOT AV VTI INDEX: 0.89
ECHO LVOT MEAN GRADIENT: 1 MMHG
ECHO LVOT PEAK GRADIENT: 2 MMHG
ECHO LVOT PEAK VELOCITY: 0.8 M/S
ECHO LVOT VTI: 17.9 CM
ECHO MV A VELOCITY: 0.76 M/S
ECHO MV E DECELERATION TIME (DT): 351 MS
ECHO MV E VELOCITY: 0.52 M/S
ECHO MV E/A RATIO: 0.68
ECHO MV E/E' LATERAL: 6.93
ECHO MV E/E' RATIO (AVERAGED): 8.47
ECHO MV E/E' SEPTAL: 10
ECHO MV REGURGITANT PEAK GRADIENT: 125 MMHG
ECHO MV REGURGITANT PEAK VELOCITY: 5.6 M/S
ECHO PV MAX VELOCITY: 0.6 M/S
ECHO PV PEAK GRADIENT: 1 MMHG
ECHO RIGHT VENTRICULAR SYSTOLIC PRESSURE (RVSP): 29 MMHG
ECHO RV FREE WALL PEAK S': 9.9 CM/S
ECHO RV INTERNAL DIMENSION: 3 CM
ECHO RV TAPSE: 2 CM (ref 1.7–?)
ECHO TV E WAVE: 0.4 M/S
ECHO TV REGURGITANT MAX VELOCITY: 2.57 M/S
ECHO TV REGURGITANT PEAK GRADIENT: 26 MMHG
NUC STRESS EJECTION FRACTION: 65 %
STRESS BASELINE DIAS BP: 64 MMHG
STRESS BASELINE HR: 59 BPM
STRESS BASELINE SYS BP: 141 MMHG
STRESS ESTIMATED WORKLOAD: 1 METS
STRESS PEAK DIAS BP: 64 MMHG
STRESS PEAK SYS BP: 141 MMHG
STRESS PERCENT HR ACHIEVED: 66 %
STRESS POST PEAK HR: 90 BPM
STRESS RATE PRESSURE PRODUCT: NORMAL BPM*MMHG
STRESS STAGE 1 BP: NORMAL MMHG
STRESS STAGE 1 DURATION: 1 MIN:SEC
STRESS STAGE 1 HR: 73 BPM
STRESS STAGE 2 BP: NORMAL MMHG
STRESS STAGE 2 DURATION: 1 MIN:SEC
STRESS STAGE 2 HR: 89 BPM
STRESS STAGE 3 BP: NORMAL MMHG
STRESS STAGE 3 DURATION: 1 MIN:SEC
STRESS STAGE 3 HR: 90 BPM
STRESS STAGE RECOVERY 1 BP: NORMAL MMHG
STRESS STAGE RECOVERY 1 DURATION: 1 MIN:SEC
STRESS STAGE RECOVERY 1 HR: 83 BPM
STRESS STAGE RECOVERY 2 BP: NORMAL MMHG
STRESS STAGE RECOVERY 2 DURATION: 1 MIN:SEC
STRESS STAGE RECOVERY 2 HR: 81 BPM
STRESS STAGE RECOVERY 3 BP: NORMAL MMHG
STRESS STAGE RECOVERY 3 DURATION: 1 MIN:SEC
STRESS STAGE RECOVERY 3 HR: 76 BPM
STRESS STAGE RECOVERY 4 BP: NORMAL MMHG
STRESS STAGE RECOVERY 4 DURATION: 2 MIN:SEC
STRESS STAGE RECOVERY 4 HR: 77 BPM
STRESS TARGET HR: 137 BPM
TID: 1.15

## 2024-10-21 PROCEDURE — 93018 CV STRESS TEST I&R ONLY: CPT | Performed by: NUCLEAR MEDICINE

## 2024-10-21 PROCEDURE — 93306 TTE W/DOPPLER COMPLETE: CPT

## 2024-10-21 PROCEDURE — 93306 TTE W/DOPPLER COMPLETE: CPT | Performed by: NUCLEAR MEDICINE

## 2024-10-21 PROCEDURE — 93016 CV STRESS TEST SUPVJ ONLY: CPT | Performed by: NUCLEAR MEDICINE

## 2024-10-21 PROCEDURE — A9500 TC99M SESTAMIBI: HCPCS | Performed by: NUCLEAR MEDICINE

## 2024-10-21 PROCEDURE — 6360000002 HC RX W HCPCS: Performed by: NUCLEAR MEDICINE

## 2024-10-21 PROCEDURE — 78452 HT MUSCLE IMAGE SPECT MULT: CPT | Performed by: NUCLEAR MEDICINE

## 2024-10-21 PROCEDURE — 93017 CV STRESS TEST TRACING ONLY: CPT

## 2024-10-21 PROCEDURE — 3430000000 HC RX DIAGNOSTIC RADIOPHARMACEUTICAL: Performed by: NUCLEAR MEDICINE

## 2024-10-21 PROCEDURE — 78452 HT MUSCLE IMAGE SPECT MULT: CPT

## 2024-10-21 RX ORDER — REGADENOSON 0.08 MG/ML
0.4 INJECTION, SOLUTION INTRAVENOUS
Status: COMPLETED | OUTPATIENT
Start: 2024-10-21 | End: 2024-10-21

## 2024-10-21 RX ORDER — TETRAKIS(2-METHOXYISOBUTYLISOCYANIDE)COPPER(I) TETRAFLUOROBORATE 1 MG/ML
30 INJECTION, POWDER, LYOPHILIZED, FOR SOLUTION INTRAVENOUS
Status: COMPLETED | OUTPATIENT
Start: 2024-10-21 | End: 2024-10-21

## 2024-10-21 RX ORDER — TETRAKIS(2-METHOXYISOBUTYLISOCYANIDE)COPPER(I) TETRAFLUOROBORATE 1 MG/ML
9.2 INJECTION, POWDER, LYOPHILIZED, FOR SOLUTION INTRAVENOUS
Status: COMPLETED | OUTPATIENT
Start: 2024-10-21 | End: 2024-10-21

## 2024-10-21 RX ADMIN — Medication 30 MILLICURIE: at 14:15

## 2024-10-21 RX ADMIN — Medication 9.2 MILLICURIE: at 12:55

## 2024-10-21 RX ADMIN — REGADENOSON 0.4 MG: 0.08 INJECTION, SOLUTION INTRAVENOUS at 14:14

## 2024-10-28 ENCOUNTER — TELEPHONE (OUTPATIENT)
Dept: CARDIOLOGY CLINIC | Age: 84
End: 2024-10-28

## 2024-10-30 RX ORDER — METOPROLOL SUCCINATE 50 MG/1
50 TABLET, EXTENDED RELEASE ORAL DAILY
Qty: 90 TABLET | Refills: 3 | Status: SHIPPED | OUTPATIENT
Start: 2024-10-30

## 2025-02-03 ENCOUNTER — HOSPITAL ENCOUNTER (OUTPATIENT)
Age: 85
Discharge: HOME | End: 2025-02-03
Payer: MEDICARE

## 2025-02-03 LAB
ALBUMIN SERPL BCG-MCNC: 3.9 G/DL (ref 3.5–5.1)
ALP SERPL-CCNC: 83 U/L (ref 38–126)
ALT SERPL W/O P-5'-P-CCNC: 11 U/L (ref 11–66)
ANION GAP SERPL CALC-SCNC: 13 MEQ/L (ref 8–16)
AST SERPL-CCNC: 18 U/L (ref 5–40)
BASOPHILS ABSOLUTE: 0.1 THOU/MM3 (ref 0–0.1)
BASOPHILS NFR BLD AUTO: 0.9 %
BILIRUB SERPL-MCNC: 0.3 MG/DL (ref 0.3–1.2)
BUN SERPL-MCNC: 14 MG/DL (ref 7–22)
CALCIUM SERPL-MCNC: 9.5 MG/DL (ref 8.5–10.5)
CHLORIDE SERPL-SCNC: 106 MEQ/L (ref 98–111)
CHOLEST SERPL-MCNC: 159 MG/DL (ref 100–199)
CO2 SERPL-SCNC: 26 MEQ/L (ref 23–33)
CREAT SERPL-MCNC: 0.9 MG/DL (ref 0.4–1.2)
DEPRECATED RDW RBC AUTO: 56.2 FL (ref 35–45)
EOSINOPHIL NFR BLD AUTO: 4.1 %
EOSINOPHILS ABSOLUTE: 0.3 THOU/MM3 (ref 0–0.4)
ERYTHROCYTE [DISTWIDTH] IN BLOOD BY AUTOMATED COUNT: 18.6 % (ref 11.5–14.5)
ERYTHROCYTE [SEDIMENTATION RATE] IN BLOOD BY WESTERGREN METHOD: 20 MM/HR (ref 0–20)
GFR SERPL CREATININE-BSD FRML MDRD: 63 ML/MIN/1.73M2
GLUCOSE SERPL-MCNC: 88 MG/DL (ref 70–108)
HCT VFR BLD AUTO: 41.7 % (ref 37–47)
HDLC SERPL-MCNC: 62 MG/DL
HGB BLD-MCNC: 12.1 GM/DL (ref 12–16)
IMM GRANULOCYTES # BLD AUTO: 0.03 THOU/MM3 (ref 0–0.07)
IMM GRANULOCYTES NFR BLD AUTO: 0.4 %
LDLC SERPL CALC-MCNC: 58 MG/DL
LYMPHOCYTES ABSOLUTE: 2.2 THOU/MM3 (ref 1–4.8)
LYMPHOCYTES NFR BLD AUTO: 27.2 %
MCH RBC QN AUTO: 24.1 PG (ref 26–33)
MCHC RBC AUTO-ENTMCNC: 29 GM/DL (ref 32.2–35.5)
MCV RBC AUTO: 83.1 FL (ref 81–99)
MONOCYTES ABSOLUTE: 0.8 THOU/MM3 (ref 0.4–1.3)
MONOCYTES NFR BLD AUTO: 9.6 %
NEUTROPHILS ABSOLUTE: 4.7 THOU/MM3 (ref 1.8–7.7)
NEUTROPHILS NFR BLD AUTO: 57.8 %
NRBC BLD AUTO-RTO: 0 /100 WBC
PLATELET # BLD AUTO: 414 THOU/MM3 (ref 130–400)
PMV BLD AUTO: 9.5 FL (ref 9.4–12.4)
POTASSIUM SERPL-SCNC: 3.7 MEQ/L (ref 3.5–5.2)
PROT SERPL-MCNC: 7.3 G/DL (ref 6.1–8)
RBC # BLD AUTO: 5.02 MILL/MM3 (ref 4.2–5.4)
SODIUM SERPL-SCNC: 145 MEQ/L (ref 135–145)
TRIGL SERPL-MCNC: 195 MG/DL (ref 0–199)
WBC # BLD AUTO: 8.2 THOU/MM3 (ref 4.8–10.8)

## 2025-02-03 PROCEDURE — 85025 COMPLETE CBC W/AUTO DIFF WBC: CPT

## 2025-02-03 PROCEDURE — 36415 COLL VENOUS BLD VENIPUNCTURE: CPT

## 2025-02-03 PROCEDURE — 85651 RBC SED RATE NONAUTOMATED: CPT

## 2025-02-03 PROCEDURE — 80061 LIPID PANEL: CPT

## 2025-02-03 PROCEDURE — 80053 COMPREHEN METABOLIC PANEL: CPT

## 2025-03-04 RX ORDER — FUROSEMIDE 20 MG/1
20 TABLET ORAL DAILY
Qty: 90 TABLET | Refills: 3 | Status: SHIPPED | OUTPATIENT
Start: 2025-03-04

## 2025-03-04 NOTE — TELEPHONE ENCOUNTER
Naty J Kempton called requesting a refill on the following medications:  Requested Prescriptions     Pending Prescriptions Disp Refills    furosemide (LASIX) 20 MG tablet 180 tablet 4     Sig: Take 1 tablet by mouth 2 times daily     Pharm[facility] verified:[facility] [phone]  .pv      Date of last visit: 10.07.2024  Date of next visit (if applicable): 10.10.2025

## (undated) DEVICE — SOLUTION IV 1000ML 0.9% SOD CHL PH 5 INJ USP VIAFLX PLAS

## (undated) DEVICE — GLOVE ORANGE PI 7   MSG9070

## (undated) DEVICE — SPONGE GZ W4XL4IN COT 12 PLY TYP VII WVN C FLD DSGN

## (undated) DEVICE — PACK PROCEDURE SURG PLAS SC MIN SRHP LF

## (undated) DEVICE — GLOVE SURG SZ 8 L11.77IN FNGR THK9.8MIL STRW LTX POLYMER

## (undated) DEVICE — STERILE COTTON BALLS LARGE 5/P: Brand: MEDLINE